# Patient Record
Sex: MALE | Race: WHITE
[De-identification: names, ages, dates, MRNs, and addresses within clinical notes are randomized per-mention and may not be internally consistent; named-entity substitution may affect disease eponyms.]

---

## 2018-07-31 ENCOUNTER — HOSPITAL ENCOUNTER (EMERGENCY)
Dept: HOSPITAL 47 - EC | Age: 73
Discharge: LEFT BEFORE DISCHARGE | End: 2018-07-31
Payer: MEDICARE

## 2018-07-31 VITALS — SYSTOLIC BLOOD PRESSURE: 120 MMHG | TEMPERATURE: 99.8 F | HEART RATE: 86 BPM | DIASTOLIC BLOOD PRESSURE: 60 MMHG

## 2018-07-31 VITALS — RESPIRATION RATE: 16 BRPM

## 2018-07-31 DIAGNOSIS — R50.9: ICD-10-CM

## 2018-07-31 DIAGNOSIS — R21: ICD-10-CM

## 2018-07-31 DIAGNOSIS — Z04.1: ICD-10-CM

## 2018-07-31 DIAGNOSIS — Z87.891: ICD-10-CM

## 2018-07-31 DIAGNOSIS — R53.1: Primary | ICD-10-CM

## 2018-07-31 LAB
ALBUMIN SERPL-MCNC: 3.9 G/DL (ref 3.5–5)
ALP SERPL-CCNC: 345 U/L (ref 38–126)
ALT SERPL-CCNC: 83 U/L (ref 21–72)
ANION GAP SERPL CALC-SCNC: 8 MMOL/L
APTT BLD: 24.8 SEC (ref 22–30)
AST SERPL-CCNC: 75 U/L (ref 17–59)
BASOPHILS # BLD AUTO: 0.1 K/UL (ref 0–0.2)
BASOPHILS NFR BLD AUTO: 1 %
BUN SERPL-SCNC: 17 MG/DL (ref 9–20)
CALCIUM SPEC-MCNC: 9 MG/DL (ref 8.4–10.2)
CHLORIDE SERPL-SCNC: 102 MMOL/L (ref 98–107)
CK SERPL-CCNC: 31 U/L (ref 55–170)
CO2 SERPL-SCNC: 25 MMOL/L (ref 22–30)
EOSINOPHIL # BLD AUTO: 2.3 K/UL (ref 0–0.7)
EOSINOPHIL NFR BLD AUTO: 29 %
ERYTHROCYTE [DISTWIDTH] IN BLOOD BY AUTOMATED COUNT: 4.77 M/UL (ref 4.3–5.9)
ERYTHROCYTE [DISTWIDTH] IN BLOOD: 15.7 % (ref 11.5–15.5)
GLUCOSE BLD-MCNC: 255 MG/DL (ref 75–99)
GLUCOSE SERPL-MCNC: 253 MG/DL (ref 74–99)
HCT VFR BLD AUTO: 39.2 % (ref 39–53)
HGB BLD-MCNC: 12.6 GM/DL (ref 13–17.5)
INR PPP: 1.6 (ref ?–1.2)
LYMPHOCYTES # SPEC AUTO: 0.8 K/UL (ref 1–4.8)
LYMPHOCYTES NFR SPEC AUTO: 10 %
MAGNESIUM SPEC-SCNC: 2.1 MG/DL (ref 1.6–2.3)
MCH RBC QN AUTO: 26.4 PG (ref 25–35)
MCHC RBC AUTO-ENTMCNC: 32.2 G/DL (ref 31–37)
MCV RBC AUTO: 82.1 FL (ref 80–100)
MONOCYTES # BLD AUTO: 0.5 K/UL (ref 0–1)
MONOCYTES NFR BLD AUTO: 6 %
NEUTROPHILS # BLD AUTO: 4.2 K/UL (ref 1.3–7.7)
NEUTROPHILS NFR BLD AUTO: 51 %
PLATELET # BLD AUTO: 194 K/UL (ref 150–450)
POTASSIUM SERPL-SCNC: 4.4 MMOL/L (ref 3.5–5.1)
PROT SERPL-MCNC: 7.1 G/DL (ref 6.3–8.2)
PT BLD: 14.4 SEC (ref 9–12)
SODIUM SERPL-SCNC: 135 MMOL/L (ref 137–145)
TROPONIN I SERPL-MCNC: <0.012 NG/ML (ref 0–0.03)
WBC # BLD AUTO: 8.1 K/UL (ref 3.8–10.6)

## 2018-07-31 PROCEDURE — 85025 COMPLETE CBC W/AUTO DIFF WBC: CPT

## 2018-07-31 PROCEDURE — 99285 EMERGENCY DEPT VISIT HI MDM: CPT

## 2018-07-31 PROCEDURE — 84443 ASSAY THYROID STIM HORMONE: CPT

## 2018-07-31 PROCEDURE — 83605 ASSAY OF LACTIC ACID: CPT

## 2018-07-31 PROCEDURE — 84100 ASSAY OF PHOSPHORUS: CPT

## 2018-07-31 PROCEDURE — 36415 COLL VENOUS BLD VENIPUNCTURE: CPT

## 2018-07-31 PROCEDURE — 71046 X-RAY EXAM CHEST 2 VIEWS: CPT

## 2018-07-31 PROCEDURE — 82553 CREATINE MB FRACTION: CPT

## 2018-07-31 PROCEDURE — 82075 ASSAY OF BREATH ETHANOL: CPT

## 2018-07-31 PROCEDURE — 84484 ASSAY OF TROPONIN QUANT: CPT

## 2018-07-31 PROCEDURE — 80053 COMPREHEN METABOLIC PANEL: CPT

## 2018-07-31 PROCEDURE — 85610 PROTHROMBIN TIME: CPT

## 2018-07-31 PROCEDURE — 82550 ASSAY OF CK (CPK): CPT

## 2018-07-31 PROCEDURE — 85730 THROMBOPLASTIN TIME PARTIAL: CPT

## 2018-07-31 PROCEDURE — 83735 ASSAY OF MAGNESIUM: CPT
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
## 2018-07-31 NOTE — XR
EXAMINATION TYPE: XR chest 2V

 

DATE OF EXAM: 7/31/2018

 

COMPARISON: 11/3/2012

 

HISTORY: Fatigue

 

TECHNIQUE:  Frontal and lateral views of the chest are obtained.

 

FINDINGS:  There is no heart failure nor confluent pneumonic infiltrate. Costophrenic angles are thomas
r. There are chest leads. There is mild thoracic dextroscoliosis.

 

IMPRESSION:  No active cardiopulmonary disease. Normal heart. No change

## 2018-08-07 ENCOUNTER — HOSPITAL ENCOUNTER (OUTPATIENT)
Dept: HOSPITAL 47 - RADNMMAIN | Age: 73
Discharge: HOME | End: 2018-08-07
Attending: INTERNAL MEDICINE
Payer: COMMERCIAL

## 2018-08-07 DIAGNOSIS — K82.8: Primary | ICD-10-CM

## 2018-08-07 PROCEDURE — 78227 HEPATOBIL SYST IMAGE W/DRUG: CPT

## 2018-08-07 NOTE — NM
EXAMINATION TYPE: NM hepatobiliary w CCK

 

DATE OF EXAM: 8/7/2018

 

COMPARISON: NONE

 

HISTORY: Pain with clinical suspicion for dysfunctional gallbladder

 

TECHNIQUE: After the intravenous administration of 4.83 mCi Tc 99m Mebrofenin hepatobiliary scintigra
phy is performed.  Immediate images post injection.

 

FINDINGS: There is satisfactory initial accumulation of tracer by the liver.  The gallbladder is visu
alized within 35 minutes.  The small bowel activity is noted within 10 minutes.  

 

At one hour CCK was administered, patient was injected with 1.89 mcg of Kinevac, and gallbladder ejec
tion fraction is calculated at 69 %, in the normal range.  Therefore there is no scintigraphic eviden
ce of cystic or common bile duct obstruction to suggest acute cholecystitis or gallbladder dyskinesia
. 

 

IMPRESSION: 

1.  Normal hepatobiliary transport and gallbladder ejection fraction. 

 

2.  However, reflux of bile into the stomach was noted, particularly prominent from 48 -60 minutes in
to the study.

## 2018-08-24 ENCOUNTER — HOSPITAL ENCOUNTER (EMERGENCY)
Dept: HOSPITAL 47 - EC | Age: 73
Discharge: OTHER INSTITUTION | End: 2018-08-24
Payer: MEDICARE

## 2018-08-24 VITALS
RESPIRATION RATE: 20 BRPM | SYSTOLIC BLOOD PRESSURE: 128 MMHG | HEART RATE: 114 BPM | TEMPERATURE: 98 F | DIASTOLIC BLOOD PRESSURE: 77 MMHG

## 2018-08-24 DIAGNOSIS — J98.11: ICD-10-CM

## 2018-08-24 DIAGNOSIS — Z79.82: ICD-10-CM

## 2018-08-24 DIAGNOSIS — Z87.891: ICD-10-CM

## 2018-08-24 DIAGNOSIS — Z79.4: ICD-10-CM

## 2018-08-24 DIAGNOSIS — E11.9: ICD-10-CM

## 2018-08-24 DIAGNOSIS — Z79.899: ICD-10-CM

## 2018-08-24 DIAGNOSIS — S12.201A: ICD-10-CM

## 2018-08-24 DIAGNOSIS — Z79.02: ICD-10-CM

## 2018-08-24 DIAGNOSIS — R79.89: ICD-10-CM
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
## 2018-08-24 DIAGNOSIS — G81.94: ICD-10-CM

## 2018-08-24 DIAGNOSIS — Z79.51: ICD-10-CM

## 2018-08-24 DIAGNOSIS — I63.9: Primary | ICD-10-CM

## 2018-08-24 DIAGNOSIS — R56.9: ICD-10-CM

## 2018-08-24 LAB
ALBUMIN SERPL-MCNC: 3.3 G/DL (ref 3.5–5)
ALP SERPL-CCNC: 361 U/L (ref 38–126)
ALT SERPL-CCNC: 44 U/L (ref 21–72)
AMYLASE SERPL-CCNC: 58 U/L (ref 30–110)
ANION GAP SERPL CALC-SCNC: 11 MMOL/L
APTT BLD: 25.7 SEC (ref 22–30)
AST SERPL-CCNC: 41 U/L (ref 17–59)
BASOPHILS # BLD AUTO: 0.1 K/UL (ref 0–0.2)
BASOPHILS NFR BLD AUTO: 1 %
BUN SERPL-SCNC: 14 MG/DL (ref 9–20)
CALCIUM SPEC-MCNC: 8.4 MG/DL (ref 8.4–10.2)
CHLORIDE SERPL-SCNC: 103 MMOL/L (ref 98–107)
CK SERPL-CCNC: 33 U/L (ref 55–170)
CO2 SERPL-SCNC: 21 MMOL/L (ref 22–30)
EOSINOPHIL # BLD AUTO: 2.6 K/UL (ref 0–0.7)
EOSINOPHIL NFR BLD AUTO: 21 %
ERYTHROCYTE [DISTWIDTH] IN BLOOD BY AUTOMATED COUNT: 5.37 M/UL (ref 4.3–5.9)
ERYTHROCYTE [DISTWIDTH] IN BLOOD: 17.2 % (ref 11.5–15.5)
GLUCOSE BLD-MCNC: 155 MG/DL (ref 75–99)
GLUCOSE BLD-MCNC: 168 MG/DL (ref 75–99)
GLUCOSE SERPL-MCNC: 186 MG/DL (ref 74–99)
HCT VFR BLD AUTO: 45.7 % (ref 39–53)
HGB BLD-MCNC: 14.1 GM/DL (ref 13–17.5)
INR PPP: 1.7 (ref ?–1.2)
LIPASE SERPL-CCNC: 113 U/L (ref 23–300)
LYMPHOCYTES # SPEC AUTO: 1.7 K/UL (ref 1–4.8)
LYMPHOCYTES NFR SPEC AUTO: 14 %
MCH RBC QN AUTO: 26.2 PG (ref 25–35)
MCHC RBC AUTO-ENTMCNC: 30.8 G/DL (ref 31–37)
MCV RBC AUTO: 85.1 FL (ref 80–100)
MONOCYTES # BLD AUTO: 0.5 K/UL (ref 0–1)
MONOCYTES NFR BLD AUTO: 4 %
NEUTROPHILS # BLD AUTO: 7.6 K/UL (ref 1.3–7.7)
NEUTROPHILS NFR BLD AUTO: 60 %
PLATELET # BLD AUTO: 305 K/UL (ref 150–450)
POTASSIUM SERPL-SCNC: 4.5 MMOL/L (ref 3.5–5.1)
PROT SERPL-MCNC: 8.2 G/DL (ref 6.3–8.2)
PT BLD: 15.5 SEC (ref 9–12)
SODIUM SERPL-SCNC: 135 MMOL/L (ref 137–145)
TROPONIN I SERPL-MCNC: 0.07 NG/ML (ref 0–0.03)
WBC # BLD AUTO: 12.6 K/UL (ref 3.8–10.6)

## 2018-08-24 PROCEDURE — 84484 ASSAY OF TROPONIN QUANT: CPT

## 2018-08-24 PROCEDURE — 80320 DRUG SCREEN QUANTALCOHOLS: CPT

## 2018-08-24 PROCEDURE — 82550 ASSAY OF CK (CPK): CPT

## 2018-08-24 PROCEDURE — 71045 X-RAY EXAM CHEST 1 VIEW: CPT

## 2018-08-24 PROCEDURE — 70450 CT HEAD/BRAIN W/O DYE: CPT

## 2018-08-24 PROCEDURE — 86900 BLOOD TYPING SEROLOGIC ABO: CPT

## 2018-08-24 PROCEDURE — 86901 BLOOD TYPING SEROLOGIC RH(D): CPT

## 2018-08-24 PROCEDURE — 85730 THROMBOPLASTIN TIME PARTIAL: CPT

## 2018-08-24 PROCEDURE — 80053 COMPREHEN METABOLIC PANEL: CPT

## 2018-08-24 PROCEDURE — 83690 ASSAY OF LIPASE: CPT

## 2018-08-24 PROCEDURE — 86850 RBC ANTIBODY SCREEN: CPT

## 2018-08-24 PROCEDURE — 96375 TX/PRO/DX INJ NEW DRUG ADDON: CPT

## 2018-08-24 PROCEDURE — 72170 X-RAY EXAM OF PELVIS: CPT

## 2018-08-24 PROCEDURE — 70496 CT ANGIOGRAPHY HEAD: CPT

## 2018-08-24 PROCEDURE — 72125 CT NECK SPINE W/O DYE: CPT

## 2018-08-24 PROCEDURE — 36415 COLL VENOUS BLD VENIPUNCTURE: CPT

## 2018-08-24 PROCEDURE — 85610 PROTHROMBIN TIME: CPT

## 2018-08-24 PROCEDURE — 85025 COMPLETE CBC W/AUTO DIFF WBC: CPT

## 2018-08-24 PROCEDURE — 99285 EMERGENCY DEPT VISIT HI MDM: CPT

## 2018-08-24 PROCEDURE — 70498 CT ANGIOGRAPHY NECK: CPT

## 2018-08-24 PROCEDURE — 96361 HYDRATE IV INFUSION ADD-ON: CPT

## 2018-08-24 PROCEDURE — 83605 ASSAY OF LACTIC ACID: CPT

## 2018-08-24 PROCEDURE — 82553 CREATINE MB FRACTION: CPT

## 2018-08-24 PROCEDURE — 96374 THER/PROPH/DIAG INJ IV PUSH: CPT

## 2018-08-24 PROCEDURE — 82150 ASSAY OF AMYLASE: CPT
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
## 2018-08-24 NOTE — CT
EXAMINATION TYPE: CT brain cspine wo con

 

DATE OF EXAM: 8/24/2018

 

COMPARISON: 10/30/2012

 

HISTORY: Fall, altered mental status

 

CT DLP: 1813.6 mGycm

Automated exposure control for dose reduction was used.

 

TECHNIQUE: CT scan of the head and cervical spine are performed without contrast.

 

FINDINGS:   There is cerebral cortical atrophy. There is no mass effect nor midline shift. There is n
o sign of intracranial hemorrhage. The calvarium is intact.

 

There is 8 mm nondisplaced chip fracture of the anterior large hypertrophic osteophyte at the C3 vert
ebral body. It is not clear if this fracture is acute. The facet joints appear intact. Skull base is 
intact. There is degenerative disc space narrowing from C3 to C7 with spurring of the endplates.

 

IMPRESSION:

Cerebral atrophy. No acute intracranial abnormality. No change.

 

Chip fracture of the anterior inferior endplate osteophyte at C3 level. This could be an acute fractu
re. No evidence of cervical spine instability. Moderate multilevel spondylosis.

## 2018-08-24 NOTE — XR
EXAMINATION TYPE: XR chest 1V portable

 

DATE OF EXAM: 8/24/2018

 

COMPARISON: 7/31/2018

 

HISTORY: Altered mental status

 

TECHNIQUE: Single frontal view of the chest is obtained.

 

FINDINGS:  Heart is normal. Thoracic aorta is atheromatous. Lungs are clear of consolidation. There a
re chest leads. There is some linear density behind the heart in the left lower lobe.

 

IMPRESSION:  There is probably some new minimal linear infiltrate and atelectasis in the left lower l
obe compared to old exam. No heart failure.

## 2018-08-24 NOTE — CT
EXAMINATION TYPE: CT angio head neck

 

DATE OF EXAM: 8/24/2018

 

HISTORY: Leaning towards left side

 

COMPARISON:

 

CT DLP: 742 mGycm.  Automated Exposure Control for Dose Reduction was Utilized.

 

TECHNIQUE:  CTA scan of the neck is performed with IV Contrast, patient injected with 65 mL of Isovue
 370, axial images are obtained, coronal and sagittal reformatted images are reviewed. Three-D recons
tructed images are created on an independent workstation and reviewed.

 

FINDINGS:

 

There is normal branching pattern of the great vessels on the aortic arch. B left carotid artery has 
origin from the innominate artery. There is bilateral arterial flow in the vertebral arteries which a
ppear fairly symmetric. There is bilateral contrast opacification of the common internal and external
 carotid arteries. There is no evidence of carotid or vertebral artery aneurysm or dissection.

 

There is normal contrast opacification of the vertebrobasilar artery system. There is arterial flow i
n the anterior middle and posterior cerebral arteries. There is suboptimal contrast density in the ar
teries. This is probably due to timing. I see no evidence of intracranial aneurysm or neovascularity.
 There is no mass effect. There is normal contrast opacification of the venous sinuses.

 

IMPRESSION: Negative CT angiogram of the head and neck. No evidence of arterial stenosis.

## 2018-08-24 NOTE — ED
General Adult HPI





- General


Stated complaint: Fall-altered mental status


Time Seen by Provider: 08/24/18 18:33


Source: patient, family, RN notes reviewed, old records reviewed


Limitations: altered mental status





- History of Present Illness


Initial comments: 





72-year-old male brought in by EMS status post fall with confusion.  Patient is 

unable to contribute to the history.  He denies pain complaints and will follow 

some basic commands but is not able to significantly contribute to the history.

  No family available at the time of initial presentation.  History obtained 

primarily from EMS.  They state that the patient fell approximately 3-4 feet 

into a bush.  He was altered.  There stroke scale was negative.  Patient had no 

specific complaints, other than his confusion which is acute.  He is normally 

alert and oriented 4.  No history of dementia.  He is on aspirin and Plavix.  

Uncertain if there was significant head trauma or loss of consciousness.





- Related Data


 Home Medications











 Medication  Instructions  Recorded  Confirmed


 


Amoxic-Pot Clav 875-125Mg 1 tab PO BID 08/24/18 08/24/18





[Augmentin 875-125]   


 


Clopidogrel [Plavix] 75 mg PO DAILY 08/24/18 08/24/18


 


Insulin Aspart [NovoLOG 12 unit SQ AC-LUNCH 08/24/18 08/24/18





(formulary)]   


 


Insulin Aspart [NovoLOG 14 unit SQ AC-BRKFST 08/24/18 08/24/18





(formulary)]   


 


Insulin Aspart [NovoLOG 16 unit SQ AC-SUPPER 08/24/18 08/24/18





(formulary)]   


 


Insulin Aspart [NovoLOG See Protocol SQ ACHS 08/24/18 08/24/18





(formulary)]   


 


Insulin Degludec [Tresiba 48 - 50 unit SQ DAILY 08/24/18 08/24/18





Flextouch U-200]   


 


Levothyroxine Sodium [Synthroid] 125 mcg PO DAILY 08/24/18 08/24/18


 


Losartan Potassium 100 mg PO DAILY 08/24/18 08/24/18


 


Phenytoin Sodium Extended 100 mg PO QID 08/24/18 08/24/18





[Dilantin]   


 


Pravastatin Sodium [Pravachol] 20 mg PO HS 08/24/18 08/24/18


 


predniSONE See Taper PO DAILY 08/24/18 08/24/18











 Allergies











Allergy/AdvReac Type Severity Reaction Status Date / Time


 


No Known Allergies Allergy   Verified 08/24/18 18:44














Review of Systems


ROS Statement: 


Those systems with pertinent positive or pertinent negative responses have been 

documented in the HPI.





ROS Other: All systems not noted in ROS Statement are negative.





Past Medical History


Past Medical History: Diabetes Mellitus


Additional Past Medical History / Comment(s): pt reports heart disorder


History of Any Multi-Drug Resistant Organisms: None Reported


Past Surgical History: Adenoidectomy, Tonsillectomy


Past Psychological History: No Psychological Hx Reported


Smoking Status: Former smoker


Past Alcohol Use History: Daily


Past Drug Use History: None Reported





General Exam


General appearance: alert, in no apparent distress


Head exam: Present: atraumatic, normocephalic


Eye exam: Present: normal appearance, PERRL, EOMI, other (Preferential left-

sided gaze)


Neck exam: Present: other (C-collar in place)


Respiratory exam: Present: normal lung sounds bilaterally.  Absent: respiratory 

distress


Cardiovascular Exam: Present: normal rhythm, tachycardia


GI/Abdominal exam: Present: soft.  Absent: distended, tenderness, guarding


Extremities exam: Present: normal inspection, normal capillary refill.  Absent: 

pedal edema


Neurological exam: Present: alert.  Absent: oriented X3, motor sensory deficit


Skin exam: Present: warm, dry.  Absent: cyanosis, diaphoretic





Course





- Reevaluation(s)


Reevaluation #1: 





08/24/18 20:00


Patient initially had left gaze deviation, no other focal findings.  He 

develops initially left-sided upper extremity drift, this progresses to 

hemiplegia.  His NIH is increasing please see nursing documentation.  This is 

discussed both at the onset and at the time of reevaluation with Dr. Loyd.  

Patient is an activated both trauma and code stroke given the complexity of his 

presentation and progression in the emergency department.  Head CT is negative 

for intracranial hemorrhage.  CT angios negative for occlusion or aneurysm.  

Further history is obtained from the patient's friend who is at bedside, she 

states he was confused which began at approximately 11.  This is the best time 

course we have at this time.  Given the trauma, focal seizure activity, and 

onset of 11 AM, patient is not a TPA candidate.  This is agreed with both 

myself and Dr. Loyd. 





EKG Findings





- EKG Comments:


EKG Findings:: EKG: Sinus tachycardia with baseline artifact secondary to 

tremor.  Left axis deviation, there be some ST segment elevation in V2 however 

there is none in V1 or V3, no reciprocal change.  Rate of 123, NE interval 188, 

QRS duration 92, 





Medical Decision Making





- Medical Decision Making





72-year-old male presenting as a priority 2 trauma with fall on aspirin and 

Plavix.  There is no external signs of head trauma.  Patient is kept in a c-

collar during transport and in the emergency department.  The patient initially 

has left gaze deviation, this progresses to left hemiplegia.  Patient is not a 

TPA candidate.  History from his friend who is at bedside indicates that his 

neurologic status began to decline at approximately 11 AM.  He is seen in the 

emergency department at 7 PM and his symptoms progress during his stay in the 

emergency department.  He does have a focal seizure which is treated with 

Ativan and Keppra.  Patient's presentation is likely related to progressive 

CVA.  However he has cervical fracture of the endplate of C3.  This appears to 

be stable on CT with no subluxation.  Patient is kept in a c-collar.  He will 

be transferred to [facility] for neurosurgical evaluation although 

uncertain if this is contributing to the patient's symptoms at this time.





CT angio negative for occlusion, CT brain without contrast negative for 

intracranial hemorrhage.  CT cervical spine C3 endplate fracture.  Chest x-ray 

shows some atelectasis in possible infiltrate.  X-ray of the pelvis is negative 

for fracture or dislocation.








Patient was activated as prior to trauma, case was discussed with trauma 

surgeon on-call.





As discussed at length with Dr. Loyd, we both agree this patient is not a TPA 

candidate.





Case discussed with Dr. Parra who is the accepting physician at [facility].





- Lab Data


Result diagrams: 


 08/24/18 18:43





 08/24/18 18:43


 Lab Results











  08/24/18 08/24/18 08/24/18 Range/Units





  18:34 18:43 18:43 


 


WBC   12.6 H   (3.8-10.6)  k/uL


 


RBC   5.37   (4.30-5.90)  m/uL


 


Hgb   14.1   (13.0-17.5)  gm/dL


 


Hct   45.7   (39.0-53.0)  %


 


MCV   85.1   (80.0-100.0)  fL


 


MCH   26.2   (25.0-35.0)  pg


 


MCHC   30.8 L   (31.0-37.0)  g/dL


 


RDW   17.2 H   (11.5-15.5)  %


 


Plt Count   305   (150-450)  k/uL


 


Neutrophils %   60   %


 


Lymphocytes %   14   %


 


Monocytes %   4   %


 


Eosinophils %   21   %


 


Basophils %   1   %


 


Neutrophils #   7.6   (1.3-7.7)  k/uL


 


Lymphocytes #   1.7   (1.0-4.8)  k/uL


 


Monocytes #   0.5   (0-1.0)  k/uL


 


Eosinophils #   2.6 H   (0-0.7)  k/uL


 


Basophils #   0.1   (0-0.2)  k/uL


 


Manual Slide Review   Performed   


 


Hypochromasia   Slight   


 


Anisocytosis   Slight   


 


PT     (9.0-12.0)  sec


 


INR     (<1.2)  


 


APTT     (22.0-30.0)  sec


 


Sodium    135 L  (137-145)  mmol/L


 


Potassium    4.5  (3.5-5.1)  mmol/L


 


Chloride    103  ()  mmol/L


 


Carbon Dioxide    21 L  (22-30)  mmol/L


 


Anion Gap    11  mmol/L


 


BUN    14  (9-20)  mg/dL


 


Creatinine    0.60 L  (0.66-1.25)  mg/dL


 


Est GFR (CKD-EPI)AfAm    >90  (>60 ml/min/1.73 sqM)  


 


Est GFR (CKD-EPI)NonAf    >90  (>60 ml/min/1.73 sqM)  


 


Glucose    186 H  (74-99)  mg/dL


 


POC Glucose (mg/dL)  155 H    (75-99)  mg/dL


 


POC Glu Operater Cinthya Vasquez    


 


Plasma Lactic Acid Atilio     (0.7-2.0)  mmol/L


 


Calcium    8.4  (8.4-10.2)  mg/dL


 


Total Bilirubin    0.7  (0.2-1.3)  mg/dL


 


AST    41  (17-59)  U/L


 


ALT    44  (21-72)  U/L


 


Alkaline Phosphatase    361 H  ()  U/L


 


Total Creatine Kinase     ()  U/L


 


CK-MB (CK-2)     (0.0-2.4)  ng/mL


 


CK-MB (CK-2) Rel Index     


 


Troponin I     (0.000-0.034)  ng/mL


 


Total Protein    8.2  (6.3-8.2)  g/dL


 


Albumin    3.3 L  (3.5-5.0)  g/dL


 


Amylase    58  ()  U/L


 


Lipase    113  ()  U/L


 


Serum Alcohol    <10  mg/dL


 


Blood Type     


 


Blood Type Confirm     


 


Blood Type Recheck     


 


Antibody Screen     


 


Spec Expiration Date     














  08/24/18 08/24/18 08/24/18 Range/Units





  18:43 18:43 18:43 


 


WBC     (3.8-10.6)  k/uL


 


RBC     (4.30-5.90)  m/uL


 


Hgb     (13.0-17.5)  gm/dL


 


Hct     (39.0-53.0)  %


 


MCV     (80.0-100.0)  fL


 


MCH     (25.0-35.0)  pg


 


MCHC     (31.0-37.0)  g/dL


 


RDW     (11.5-15.5)  %


 


Plt Count     (150-450)  k/uL


 


Neutrophils %     %


 


Lymphocytes %     %


 


Monocytes %     %


 


Eosinophils %     %


 


Basophils %     %


 


Neutrophils #     (1.3-7.7)  k/uL


 


Lymphocytes #     (1.0-4.8)  k/uL


 


Monocytes #     (0-1.0)  k/uL


 


Eosinophils #     (0-0.7)  k/uL


 


Basophils #     (0-0.2)  k/uL


 


Manual Slide Review     


 


Hypochromasia     


 


Anisocytosis     


 


PT   15.5 H   (9.0-12.0)  sec


 


INR   1.7 H   (<1.2)  


 


APTT   25.7   (22.0-30.0)  sec


 


Sodium     (137-145)  mmol/L


 


Potassium     (3.5-5.1)  mmol/L


 


Chloride     ()  mmol/L


 


Carbon Dioxide     (22-30)  mmol/L


 


Anion Gap     mmol/L


 


BUN     (9-20)  mg/dL


 


Creatinine     (0.66-1.25)  mg/dL


 


Est GFR (CKD-EPI)AfAm     (>60 ml/min/1.73 sqM)  


 


Est GFR (CKD-EPI)NonAf     (>60 ml/min/1.73 sqM)  


 


Glucose     (74-99)  mg/dL


 


POC Glucose (mg/dL)     (75-99)  mg/dL


 


POC Glu Operater ID     


 


Plasma Lactic Acid Atilio    3.1 H*  (0.7-2.0)  mmol/L


 


Calcium     (8.4-10.2)  mg/dL


 


Total Bilirubin     (0.2-1.3)  mg/dL


 


AST     (17-59)  U/L


 


ALT     (21-72)  U/L


 


Alkaline Phosphatase     ()  U/L


 


Total Creatine Kinase  33 L    ()  U/L


 


CK-MB (CK-2)  5.7 H*    (0.0-2.4)  ng/mL


 


CK-MB (CK-2) Rel Index  17.3    


 


Troponin I  0.068 H*    (0.000-0.034)  ng/mL


 


Total Protein     (6.3-8.2)  g/dL


 


Albumin     (3.5-5.0)  g/dL


 


Amylase     ()  U/L


 


Lipase     ()  U/L


 


Serum Alcohol     mg/dL


 


Blood Type     


 


Blood Type Confirm     


 


Blood Type Recheck     


 


Antibody Screen     


 


Spec Expiration Date     














  08/24/18 08/24/18 08/24/18 Range/Units





  18:43 18:43 19:30 


 


WBC     (3.8-10.6)  k/uL


 


RBC     (4.30-5.90)  m/uL


 


Hgb     (13.0-17.5)  gm/dL


 


Hct     (39.0-53.0)  %


 


MCV     (80.0-100.0)  fL


 


MCH     (25.0-35.0)  pg


 


MCHC     (31.0-37.0)  g/dL


 


RDW     (11.5-15.5)  %


 


Plt Count     (150-450)  k/uL


 


Neutrophils %     %


 


Lymphocytes %     %


 


Monocytes %     %


 


Eosinophils %     %


 


Basophils %     %


 


Neutrophils #     (1.3-7.7)  k/uL


 


Lymphocytes #     (1.0-4.8)  k/uL


 


Monocytes #     (0-1.0)  k/uL


 


Eosinophils #     (0-0.7)  k/uL


 


Basophils #     (0-0.2)  k/uL


 


Manual Slide Review     


 


Hypochromasia     


 


Anisocytosis     


 


PT     (9.0-12.0)  sec


 


INR     (<1.2)  


 


APTT     (22.0-30.0)  sec


 


Sodium     (137-145)  mmol/L


 


Potassium     (3.5-5.1)  mmol/L


 


Chloride     ()  mmol/L


 


Carbon Dioxide     (22-30)  mmol/L


 


Anion Gap     mmol/L


 


BUN     (9-20)  mg/dL


 


Creatinine     (0.66-1.25)  mg/dL


 


Est GFR (CKD-EPI)AfAm     (>60 ml/min/1.73 sqM)  


 


Est GFR (CKD-EPI)NonAf     (>60 ml/min/1.73 sqM)  


 


Glucose     (74-99)  mg/dL


 


POC Glucose (mg/dL)    168 H  (75-99)  mg/dL


 


POC Glu Operater ID    Crista Rossi  


 


Plasma Lactic Acid Atilio     (0.7-2.0)  mmol/L


 


Calcium     (8.4-10.2)  mg/dL


 


Total Bilirubin     (0.2-1.3)  mg/dL


 


AST     (17-59)  U/L


 


ALT     (21-72)  U/L


 


Alkaline Phosphatase     ()  U/L


 


Total Creatine Kinase     ()  U/L


 


CK-MB (CK-2)     (0.0-2.4)  ng/mL


 


CK-MB (CK-2) Rel Index     


 


Troponin I     (0.000-0.034)  ng/mL


 


Total Protein     (6.3-8.2)  g/dL


 


Albumin     (3.5-5.0)  g/dL


 


Amylase     ()  U/L


 


Lipase     ()  U/L


 


Serum Alcohol     mg/dL


 


Blood Type  O Positive    


 


Blood Type Confirm   O Positive   


 


Blood Type Recheck  CABO Indicated    


 


Antibody Screen  NEGATIVE    


 


Spec Expiration Date  08/27/2018 - 2343    














Critical Care Time


Critical Care Time: Yes


Total Critical Care Time: 35





Disposition


Clinical Impression: 


 C3 cervical fracture, Troponin level elevated, CVA (cerebral vascular accident)





Disposition: OTHER INSTITUTION NOT DEFINED


Condition: Serious


Is patient prescribed a controlled substance at d/c from ED?: No


Referrals: 


Dain Reinoso MD [Primary Care Provider] - 1-2 days


Time of Disposition: 20:15





- Out of Hospital Transfer - Req. Specs


Out of Hospital Transfer - Requested Specifics: Other Emergency Center (

Transferred to [facility])